# Patient Record
Sex: FEMALE | Race: BLACK OR AFRICAN AMERICAN | Employment: FULL TIME | URBAN - METROPOLITAN AREA
[De-identification: names, ages, dates, MRNs, and addresses within clinical notes are randomized per-mention and may not be internally consistent; named-entity substitution may affect disease eponyms.]

---

## 2017-01-06 ENCOUNTER — HOSPITAL ENCOUNTER (OUTPATIENT)
Age: 37
Discharge: HOME | End: 2017-01-06
Payer: COMMERCIAL

## 2017-01-06 VITALS
HEIGHT: 62 IN | DIASTOLIC BLOOD PRESSURE: 73 MMHG | SYSTOLIC BLOOD PRESSURE: 115 MMHG | WEIGHT: 176 LBS | RESPIRATION RATE: 16 BRPM | HEART RATE: 96 BPM | OXYGEN SATURATION: 97 % | TEMPERATURE: 98 F | BODY MASS INDEX: 32.39 KG/M2

## 2017-01-06 DIAGNOSIS — J03.90 EXUDATIVE TONSILLITIS: Primary | ICD-10-CM

## 2017-01-06 DIAGNOSIS — J06.9 UPPER RESPIRATORY TRACT INFECTION, UNSPECIFIED TYPE: ICD-10-CM

## 2017-01-06 LAB — POCT RAPID STREP: NEGATIVE

## 2017-01-06 PROCEDURE — 99214 OFFICE O/P EST MOD 30 MIN: CPT

## 2017-01-06 PROCEDURE — 87081 CULTURE SCREEN ONLY: CPT | Performed by: PHYSICIAN ASSISTANT

## 2017-01-06 PROCEDURE — 87430 STREP A AG IA: CPT | Performed by: PHYSICIAN ASSISTANT

## 2017-01-06 RX ORDER — CEFDINIR 300 MG/1
300 CAPSULE ORAL 2 TIMES DAILY
Qty: 20 CAPSULE | Refills: 0 | Status: SHIPPED | OUTPATIENT
Start: 2017-01-06 | End: 2017-01-16

## 2017-01-06 NOTE — ED INITIAL ASSESSMENT (HPI)
Sore throat, fever, cough for a few days, was waiting to see if I got better but have blood tinge sputum after coughing

## 2017-01-06 NOTE — ED PROVIDER NOTES
Patient Seen in: [facility]    History   Patient presents with:  Cough/URI    Stated Complaint: sorethroat / body aches / chills x 3 days    HPI    35yo F who comes in with complaints of sore throat, fever, chills, pain while sw 1118 97.8 °F (36.6 °C)   Temp src 01/06/17 1118 Temporal   SpO2 01/06/17 1118 97 %   O2 Device 01/06/17 1118 None (Room air)       Current:/73 mmHg  Pulse 96  Temp(Src) 97.8 °F (36.6 °C) (Temporal)  Resp 16  Ht 157.5 cm (5' 2\")  Wt 79.833 kg  BMI 32 (two) times daily. Qty: 20 capsule Refills: 0  Comments: Rxn to PCN is hives no anaphylaxis  Associated Diagnoses:Exudative tonsillitis;  Upper respiratory tract infection, unspecified type

## 2017-01-07 NOTE — ED NOTES
Call placed to pt's phone in response to [facility] text. Message left on voice mail asking pt to please return our call if any further questions or concerns.

## 2017-04-27 PROCEDURE — 87491 CHLMYD TRACH DNA AMP PROBE: CPT | Performed by: OBSTETRICS & GYNECOLOGY

## 2017-04-27 PROCEDURE — 87591 N.GONORRHOEAE DNA AMP PROB: CPT | Performed by: OBSTETRICS & GYNECOLOGY

## 2017-07-17 PROBLEM — O09.511 ADVANCED MATERNAL AGE, 1ST PREGNANCY, FIRST TRIMESTER: Status: ACTIVE | Noted: 2017-07-17

## 2017-07-17 PROCEDURE — 87491 CHLMYD TRACH DNA AMP PROBE: CPT | Performed by: OBSTETRICS & GYNECOLOGY

## 2017-07-17 PROCEDURE — 87591 N.GONORRHOEAE DNA AMP PROB: CPT | Performed by: OBSTETRICS & GYNECOLOGY

## 2017-08-04 PROCEDURE — 36415 COLL VENOUS BLD VENIPUNCTURE: CPT | Performed by: OBSTETRICS & GYNECOLOGY

## 2017-08-04 PROCEDURE — 85660 RBC SICKLE CELL TEST: CPT | Performed by: OBSTETRICS & GYNECOLOGY

## 2017-08-04 PROCEDURE — 86850 RBC ANTIBODY SCREEN: CPT | Performed by: OBSTETRICS & GYNECOLOGY

## 2017-08-04 PROCEDURE — 86762 RUBELLA ANTIBODY: CPT | Performed by: OBSTETRICS & GYNECOLOGY

## 2017-08-04 PROCEDURE — 87389 HIV-1 AG W/HIV-1&-2 AB AG IA: CPT | Performed by: OBSTETRICS & GYNECOLOGY

## 2017-08-04 PROCEDURE — 86900 BLOOD TYPING SEROLOGIC ABO: CPT | Performed by: OBSTETRICS & GYNECOLOGY

## 2017-08-04 PROCEDURE — 87086 URINE CULTURE/COLONY COUNT: CPT | Performed by: OBSTETRICS & GYNECOLOGY

## 2017-08-04 PROCEDURE — 86780 TREPONEMA PALLIDUM: CPT | Performed by: OBSTETRICS & GYNECOLOGY

## 2017-08-04 PROCEDURE — 86901 BLOOD TYPING SEROLOGIC RH(D): CPT | Performed by: OBSTETRICS & GYNECOLOGY

## 2017-08-04 PROCEDURE — 85025 COMPLETE CBC W/AUTO DIFF WBC: CPT | Performed by: OBSTETRICS & GYNECOLOGY

## 2017-08-04 PROCEDURE — 87340 HEPATITIS B SURFACE AG IA: CPT | Performed by: OBSTETRICS & GYNECOLOGY

## 2017-11-28 PROCEDURE — 82950 GLUCOSE TEST: CPT | Performed by: OBSTETRICS & GYNECOLOGY

## 2017-11-28 PROCEDURE — 86780 TREPONEMA PALLIDUM: CPT | Performed by: OBSTETRICS & GYNECOLOGY

## 2017-11-28 PROCEDURE — 87389 HIV-1 AG W/HIV-1&-2 AB AG IA: CPT | Performed by: OBSTETRICS & GYNECOLOGY

## 2017-12-07 PROCEDURE — 87086 URINE CULTURE/COLONY COUNT: CPT | Performed by: OBSTETRICS & GYNECOLOGY

## 2017-12-08 PROCEDURE — 82952 GTT-ADDED SAMPLES: CPT | Performed by: OBSTETRICS & GYNECOLOGY

## 2017-12-08 PROCEDURE — 82951 GLUCOSE TOLERANCE TEST (GTT): CPT | Performed by: OBSTETRICS & GYNECOLOGY

## 2017-12-08 PROCEDURE — 36415 COLL VENOUS BLD VENIPUNCTURE: CPT | Performed by: OBSTETRICS & GYNECOLOGY

## 2017-12-17 PROBLEM — O24.419 GDM (GESTATIONAL DIABETES MELLITUS): Status: RESOLVED | Noted: 2017-12-17 | Resolved: 2017-12-17

## 2017-12-17 PROBLEM — O24.419 GDM (GESTATIONAL DIABETES MELLITUS): Status: ACTIVE | Noted: 2017-12-17

## 2017-12-31 ENCOUNTER — HOSPITAL ENCOUNTER (OUTPATIENT)
Facility: HOSPITAL | Age: 37
Setting detail: OBSERVATION
Discharge: HOME | End: 2017-12-31
Attending: OBSTETRICS & GYNECOLOGY | Admitting: OBSTETRICS & GYNECOLOGY
Payer: COMMERCIAL

## 2017-12-31 ENCOUNTER — HOSPITAL ENCOUNTER (EMERGENCY)
Age: 37
Discharge: LEFT BEFORE DISCHARGE | End: 2017-12-31
Payer: COMMERCIAL

## 2017-12-31 ENCOUNTER — APPOINTMENT (OUTPATIENT)
Dept: ULTRASOUND IMAGING | Facility: HOSPITAL | Age: 37
End: 2017-12-31
Attending: OBSTETRICS & GYNECOLOGY
Payer: COMMERCIAL

## 2017-12-31 VITALS
RESPIRATION RATE: 16 BRPM | SYSTOLIC BLOOD PRESSURE: 104 MMHG | BODY MASS INDEX: 35.03 KG/M2 | OXYGEN SATURATION: 91 % | TEMPERATURE: 98 F | HEIGHT: 62.01 IN | HEART RATE: 93 BPM | DIASTOLIC BLOOD PRESSURE: 66 MMHG | WEIGHT: 192.81 LBS

## 2017-12-31 PROBLEM — Z34.90 PREGNANCY: Status: ACTIVE | Noted: 2017-12-31

## 2017-12-31 LAB
ALBUMIN SERPL-MCNC: 2.5 G/DL (ref 3.5–4.8)
ALP LIVER SERPL-CCNC: 108 U/L (ref 37–98)
ALT SERPL-CCNC: 20 U/L (ref 14–54)
AST SERPL-CCNC: 14 U/L (ref 15–41)
BASOPHILS # BLD AUTO: 0.02 X10(3) UL (ref 0–0.1)
BASOPHILS NFR BLD AUTO: 0.2 %
BILIRUB SERPL-MCNC: 0.3 MG/DL (ref 0.1–2)
BILIRUBIN URINE: NEGATIVE
BLOOD URINE: NEGATIVE
BUN BLD-MCNC: 5 MG/DL (ref 8–20)
CALCIUM BLD-MCNC: 8.8 MG/DL (ref 8.3–10.3)
CHLORIDE: 104 MMOL/L (ref 101–111)
CO2: 24 MMOL/L (ref 22–32)
CONTROL RUN WITHIN 24 HOURS?: YES
CREAT BLD-MCNC: 0.51 MG/DL (ref 0.55–1.02)
EOSINOPHIL # BLD AUTO: 0.12 X10(3) UL (ref 0–0.3)
EOSINOPHIL NFR BLD AUTO: 1.2 %
ERYTHROCYTE [DISTWIDTH] IN BLOOD BY AUTOMATED COUNT: 14.5 % (ref 11.5–16)
FETAL FIBRINECTIN: NEGATIVE
GLUCOSE BLD-MCNC: 97 MG/DL (ref 70–99)
GLUCOSE URINE: NEGATIVE
HCT VFR BLD AUTO: 33.8 % (ref 34–50)
HGB BLD-MCNC: 11 G/DL (ref 12–16)
IMMATURE GRANULOCYTE COUNT: 0.08 X10(3) UL (ref 0–1)
IMMATURE GRANULOCYTE RATIO %: 0.8 %
KETONE URINE: NEGATIVE
LEUKOCYTE ESTERASE URINE: NEGATIVE
LYMPHOCYTES # BLD AUTO: 1.46 X10(3) UL (ref 0.9–4)
LYMPHOCYTES NFR BLD AUTO: 14.9 %
M PROTEIN MFR SERPL ELPH: 6.7 G/DL (ref 6.1–8.3)
MCH RBC QN AUTO: 27.4 PG (ref 27–33.2)
MCHC RBC AUTO-ENTMCNC: 32.5 G/DL (ref 31–37)
MCV RBC AUTO: 84.1 FL (ref 81–100)
MONOCYTES # BLD AUTO: 0.63 X10(3) UL (ref 0.1–0.6)
MONOCYTES NFR BLD AUTO: 6.4 %
NEUTROPHIL ABS PRELIM: 7.47 X10 (3) UL (ref 1.3–6.7)
NEUTROPHILS # BLD AUTO: 7.47 X10(3) UL (ref 1.3–6.7)
NEUTROPHILS NFR BLD AUTO: 76.5 %
NITRITE URINE: NEGATIVE
PH URINE: 6 (ref 5–8)
PLATELET # BLD AUTO: 216 10(3)UL (ref 150–450)
POTASSIUM SERPL-SCNC: 3.8 MMOL/L (ref 3.6–5.1)
PROTEIN URINE: NEGATIVE
RBC # BLD AUTO: 4.02 X10(6)UL (ref 3.8–5.1)
RED CELL DISTRIBUTION WIDTH-SD: 44.1 FL (ref 35.1–46.3)
SODIUM SERPL-SCNC: 137 MMOL/L (ref 136–144)
SPEC GRAVITY: 1.02 (ref 1–1.03)
URINE CLARITY: CLEAR
URINE COLOR: YELLOW
UROBILINOGEN URINE: 0.2
WBC # BLD AUTO: 9.8 X10(3) UL (ref 4–13)

## 2017-12-31 PROCEDURE — 80053 COMPREHEN METABOLIC PANEL: CPT | Performed by: OBSTETRICS & GYNECOLOGY

## 2017-12-31 PROCEDURE — 76770 US EXAM ABDO BACK WALL COMP: CPT | Performed by: OBSTETRICS & GYNECOLOGY

## 2017-12-31 PROCEDURE — 96372 THER/PROPH/DIAG INJ SC/IM: CPT

## 2017-12-31 PROCEDURE — 96374 THER/PROPH/DIAG INJ IV PUSH: CPT

## 2017-12-31 PROCEDURE — 85025 COMPLETE CBC W/AUTO DIFF WBC: CPT | Performed by: OBSTETRICS & GYNECOLOGY

## 2017-12-31 PROCEDURE — 96361 HYDRATE IV INFUSION ADD-ON: CPT

## 2017-12-31 PROCEDURE — 81002 URINALYSIS NONAUTO W/O SCOPE: CPT

## 2017-12-31 PROCEDURE — 82731 ASSAY OF FETAL FIBRONECTIN: CPT | Performed by: OBSTETRICS & GYNECOLOGY

## 2017-12-31 RX ORDER — NIFEDIPINE 20 MG/1
20 CAPSULE ORAL EVERY 6 HOURS SCHEDULED
Qty: 56 CAPSULE | Refills: 0 | Status: SHIPPED | OUTPATIENT
Start: 2017-12-31 | End: 2018-01-14

## 2017-12-31 RX ORDER — SODIUM CHLORIDE, SODIUM LACTATE, POTASSIUM CHLORIDE, CALCIUM CHLORIDE 600; 310; 30; 20 MG/100ML; MG/100ML; MG/100ML; MG/100ML
INJECTION, SOLUTION INTRAVENOUS CONTINUOUS
Status: DISCONTINUED | OUTPATIENT
Start: 2017-12-31 | End: 2017-12-31

## 2017-12-31 RX ORDER — TERBUTALINE SULFATE 1 MG/ML
INJECTION, SOLUTION SUBCUTANEOUS
Status: DISCONTINUED
Start: 2017-12-31 | End: 2017-12-31

## 2017-12-31 RX ORDER — NIFEDIPINE 20 MG/1
20 CAPSULE ORAL EVERY 6 HOURS SCHEDULED
Status: DISCONTINUED | OUTPATIENT
Start: 2017-12-31 | End: 2017-12-31

## 2017-12-31 RX ORDER — HYDROMORPHONE HYDROCHLORIDE 1 MG/ML
0.2 INJECTION, SOLUTION INTRAMUSCULAR; INTRAVENOUS; SUBCUTANEOUS ONCE
Status: COMPLETED | OUTPATIENT
Start: 2017-12-31 | End: 2017-12-31

## 2017-12-31 RX ORDER — TERBUTALINE SULFATE 1 MG/ML
0.25 INJECTION, SOLUTION SUBCUTANEOUS ONCE
Status: COMPLETED | OUTPATIENT
Start: 2017-12-31 | End: 2017-12-31

## 2017-12-31 NOTE — PLAN OF CARE
ANTEPARTUM/LABOR and DELIVERY    • Maintain pregnancy as long as maternal and/or fetal condition is stable Progressing        ANXIETY    • Will report anxiety at manageable levels Progressing        BIRTH - VAGINAL/ SECTION    • Fetal and maternal

## 2017-12-31 NOTE — PROGRESS NOTES
Pt is d/c'd to home in stable condition via wheelchair. Both written and verbal instructions provided. Questions answered. PT verbalizes understanding and agreement.

## 2017-12-31 NOTE — PROGRESS NOTES
with an EDC of 18 (32 & 2/7 weeks gestation) presents to triage 3 reporting RLQ pain that comes and goes all day long; pain worsening since . Rates pain 7 or 8 out of 10. Pain correlates with contractions.   Patient denies bleeding or leakin

## 2017-12-31 NOTE — H&P
OB H&P    39 yo  at [place] [id number] presents with RLQ abdominal pain rating 7-8/20 last night. Upon presentation she also was noted to have contractions every 3-5 minutes where her pain worsened. She denies lof, no vb, +FM.   She has a known history of fibroid Smokeless tobacco: Never Used                        Alcohol use: Yes           0.0 oz/week       Comment: OCCAS    Drug use:  No              Sexual activity: Yes               Partners with: Male       Birth control/protection:         A compreh

## 2018-01-04 PROBLEM — O47.03 THREATENED PREMATURE LABOR IN THIRD TRIMESTER: Status: ACTIVE | Noted: 2018-01-04

## 2018-01-31 PROCEDURE — 87081 CULTURE SCREEN ONLY: CPT | Performed by: OBSTETRICS & GYNECOLOGY

## 2018-01-31 PROCEDURE — 87653 STREP B DNA AMP PROBE: CPT | Performed by: OBSTETRICS & GYNECOLOGY

## 2018-02-07 PROBLEM — O09.513 ADVANCED MATERNAL AGE, 1ST PREGNANCY, THIRD TRIMESTER: Status: ACTIVE | Noted: 2017-07-17

## 2018-02-13 ENCOUNTER — TELEPHONE (OUTPATIENT)
Dept: OBGYN UNIT | Facility: HOSPITAL | Age: 38
End: 2018-02-13

## 2018-02-13 PROBLEM — O47.03 THREATENED PREMATURE LABOR IN THIRD TRIMESTER: Status: RESOLVED | Noted: 2018-01-04 | Resolved: 2018-02-13

## 2018-02-16 NOTE — H&P
[address] and Delivery Prenatal History and Physical Interval Addendum  Please see full Prenatal Record for this pregnancy      SUBJECTIVE:    Interval History:      This is a pregnancy at 39 weeks admitted for  due to fetal malpresenta

## 2018-02-17 ENCOUNTER — HOSPITAL ENCOUNTER (INPATIENT)
Facility: HOSPITAL | Age: 38
LOS: 4 days | Discharge: HOME | End: 2018-02-21
Attending: OBSTETRICS & GYNECOLOGY | Admitting: OBSTETRICS & GYNECOLOGY
Payer: COMMERCIAL

## 2018-02-17 ENCOUNTER — SURGERY (OUTPATIENT)
Age: 38
End: 2018-02-17

## 2018-02-17 LAB
ANTIBODY SCREEN: NEGATIVE
BASOPHILS # BLD AUTO: 0.01 X10(3) UL (ref 0–0.1)
BASOPHILS NFR BLD AUTO: 0.2 %
BILIRUBIN URINE: NEGATIVE
BLOOD URINE: NEGATIVE
CONTROL RUN WITHIN 24 HOURS?: YES
EOSINOPHIL # BLD AUTO: 0.04 X10(3) UL (ref 0–0.3)
EOSINOPHIL NFR BLD AUTO: 0.7 %
ERYTHROCYTE [DISTWIDTH] IN BLOOD BY AUTOMATED COUNT: 16 % (ref 11.5–16)
GLUCOSE URINE: NEGATIVE
HCT VFR BLD AUTO: 31.5 % (ref 34–50)
HGB BLD-MCNC: 10.4 G/DL (ref 12–16)
IMMATURE GRANULOCYTE COUNT: 0.05 X10(3) UL (ref 0–1)
IMMATURE GRANULOCYTE RATIO %: 0.8 %
KETONE URINE: NEGATIVE
LEUKOCYTE ESTERASE URINE: NEGATIVE
LYMPHOCYTES # BLD AUTO: 1.19 X10(3) UL (ref 0.9–4)
LYMPHOCYTES NFR BLD AUTO: 19.7 %
MCH RBC QN AUTO: 26.7 PG (ref 27–33.2)
MCHC RBC AUTO-ENTMCNC: 33 G/DL (ref 31–37)
MCV RBC AUTO: 81 FL (ref 81–100)
MONOCYTES # BLD AUTO: 0.45 X10(3) UL (ref 0.1–1)
MONOCYTES NFR BLD AUTO: 7.5 %
NEUTROPHIL ABS PRELIM: 4.29 X10 (3) UL (ref 1.3–6.7)
NEUTROPHILS # BLD AUTO: 4.29 X10(3) UL (ref 1.3–6.7)
NEUTROPHILS NFR BLD AUTO: 71.1 %
NITRITE URINE: NEGATIVE
PH URINE: 6.5 (ref 5–8)
PLATELET # BLD AUTO: 227 10(3)UL (ref 150–450)
RAPID HIV: NONREACTIVE
RBC # BLD AUTO: 3.89 X10(6)UL (ref 3.8–5.1)
RED CELL DISTRIBUTION WIDTH-SD: 46.4 FL (ref 35.1–46.3)
RH BLOOD TYPE: POSITIVE
SPEC GRAVITY: 1.02 (ref 1–1.03)
T PALLIDUM AB SER QL IA: NONREACTIVE
URINE CLARITY: CLEAR
URINE COLOR: YELLOW
UROBILINOGEN URINE: 0.2
WBC # BLD AUTO: 6 X10(3) UL (ref 4–13)

## 2018-02-17 PROCEDURE — 85025 COMPLETE CBC W/AUTO DIFF WBC: CPT | Performed by: OBSTETRICS & GYNECOLOGY

## 2018-02-17 PROCEDURE — 86900 BLOOD TYPING SEROLOGIC ABO: CPT | Performed by: OBSTETRICS & GYNECOLOGY

## 2018-02-17 PROCEDURE — 86701 HIV-1ANTIBODY: CPT | Performed by: OBSTETRICS & GYNECOLOGY

## 2018-02-17 PROCEDURE — 86780 TREPONEMA PALLIDUM: CPT | Performed by: OBSTETRICS & GYNECOLOGY

## 2018-02-17 PROCEDURE — 86850 RBC ANTIBODY SCREEN: CPT | Performed by: OBSTETRICS & GYNECOLOGY

## 2018-02-17 PROCEDURE — 81002 URINALYSIS NONAUTO W/O SCOPE: CPT

## 2018-02-17 PROCEDURE — 86901 BLOOD TYPING SEROLOGIC RH(D): CPT | Performed by: OBSTETRICS & GYNECOLOGY

## 2018-02-17 PROCEDURE — 88307 TISSUE EXAM BY PATHOLOGIST: CPT | Performed by: OBSTETRICS & GYNECOLOGY

## 2018-02-17 RX ORDER — GENTAMICIN SULFATE 40 MG/ML
5 INJECTION, SOLUTION INTRAMUSCULAR; INTRAVENOUS ONCE
Status: COMPLETED | OUTPATIENT
Start: 2018-02-17 | End: 2018-02-17

## 2018-02-17 RX ORDER — DEXTROSE, SODIUM CHLORIDE, SODIUM LACTATE, POTASSIUM CHLORIDE, AND CALCIUM CHLORIDE 5; .6; .31; .03; .02 G/100ML; G/100ML; G/100ML; G/100ML; G/100ML
INJECTION, SOLUTION INTRAVENOUS CONTINUOUS
Status: DISCONTINUED | OUTPATIENT
Start: 2018-02-17 | End: 2018-02-21

## 2018-02-17 RX ORDER — METOCLOPRAMIDE HYDROCHLORIDE 5 MG/ML
10 INJECTION INTRAMUSCULAR; INTRAVENOUS EVERY 6 HOURS PRN
Status: DISCONTINUED | OUTPATIENT
Start: 2018-02-17 | End: 2018-02-21

## 2018-02-17 RX ORDER — KETOROLAC TROMETHAMINE 30 MG/ML
30 INJECTION, SOLUTION INTRAMUSCULAR; INTRAVENOUS ONCE AS NEEDED
Status: COMPLETED | OUTPATIENT
Start: 2018-02-17 | End: 2018-02-17

## 2018-02-17 RX ORDER — KETOROLAC TROMETHAMINE 30 MG/ML
30 INJECTION, SOLUTION INTRAMUSCULAR; INTRAVENOUS EVERY 6 HOURS PRN
Status: DISPENSED | OUTPATIENT
Start: 2018-02-17 | End: 2018-02-19

## 2018-02-17 RX ORDER — DIPHENHYDRAMINE HYDROCHLORIDE 50 MG/ML
25 INJECTION INTRAMUSCULAR; INTRAVENOUS ONCE AS NEEDED
Status: ACTIVE | OUTPATIENT
Start: 2018-02-17 | End: 2018-02-17

## 2018-02-17 RX ORDER — SODIUM CHLORIDE, SODIUM LACTATE, POTASSIUM CHLORIDE, CALCIUM CHLORIDE 600; 310; 30; 20 MG/100ML; MG/100ML; MG/100ML; MG/100ML
INJECTION, SOLUTION INTRAVENOUS CONTINUOUS
Status: DISCONTINUED | OUTPATIENT
Start: 2018-02-17 | End: 2018-02-21

## 2018-02-17 RX ORDER — CLINDAMYCIN PHOSPHATE 900 MG/50ML
INJECTION INTRAVENOUS
Status: DISCONTINUED | OUTPATIENT
Start: 2018-02-17 | End: 2018-02-21

## 2018-02-17 RX ORDER — ZOLPIDEM TARTRATE 5 MG/1
5 TABLET ORAL NIGHTLY PRN
Status: DISCONTINUED | OUTPATIENT
Start: 2018-02-17 | End: 2018-02-21

## 2018-02-17 RX ORDER — NALOXONE HYDROCHLORIDE 0.4 MG/ML
0.08 INJECTION, SOLUTION INTRAMUSCULAR; INTRAVENOUS; SUBCUTANEOUS
Status: ACTIVE | OUTPATIENT
Start: 2018-02-17 | End: 2018-02-18

## 2018-02-17 RX ORDER — NALBUPHINE HCL 10 MG/ML
2.5 AMPUL (ML) INJECTION EVERY 4 HOURS PRN
Status: DISCONTINUED | OUTPATIENT
Start: 2018-02-17 | End: 2018-02-21

## 2018-02-17 RX ORDER — SODIUM CHLORIDE 9 MG/ML
100 INJECTION, SOLUTION INTRAVENOUS ONCE
Status: DISCONTINUED | OUTPATIENT
Start: 2018-02-17 | End: 2018-02-17

## 2018-02-17 RX ORDER — IBUPROFEN 600 MG/1
600 TABLET ORAL EVERY 6 HOURS SCHEDULED
Status: DISCONTINUED | OUTPATIENT
Start: 2018-02-18 | End: 2018-02-21

## 2018-02-17 RX ORDER — DIPHENHYDRAMINE HCL 25 MG
25 CAPSULE ORAL EVERY 4 HOURS PRN
Status: DISCONTINUED | OUTPATIENT
Start: 2018-02-17 | End: 2018-02-21

## 2018-02-17 RX ORDER — HYDROCODONE BITARTRATE AND ACETAMINOPHEN 10; 325 MG/1; MG/1
1 TABLET ORAL EVERY 4 HOURS PRN
Status: DISCONTINUED | OUTPATIENT
Start: 2018-02-17 | End: 2018-02-21

## 2018-02-17 RX ORDER — SIMETHICONE 80 MG
80 TABLET,CHEWABLE ORAL 3 TIMES DAILY PRN
Status: DISCONTINUED | OUTPATIENT
Start: 2018-02-17 | End: 2018-02-21

## 2018-02-17 RX ORDER — HYDROCODONE BITARTRATE AND ACETAMINOPHEN 5; 325 MG/1; MG/1
1 TABLET ORAL EVERY 4 HOURS PRN
Status: DISCONTINUED | OUTPATIENT
Start: 2018-02-17 | End: 2018-02-21

## 2018-02-17 RX ORDER — GENTAMICIN SULFATE 40 MG/ML
INJECTION, SOLUTION INTRAMUSCULAR; INTRAVENOUS
Status: COMPLETED
Start: 2018-02-17 | End: 2018-02-17

## 2018-02-17 RX ORDER — DIPHENHYDRAMINE HYDROCHLORIDE 50 MG/ML
12.5 INJECTION INTRAMUSCULAR; INTRAVENOUS EVERY 4 HOURS PRN
Status: DISCONTINUED | OUTPATIENT
Start: 2018-02-17 | End: 2018-02-21

## 2018-02-17 RX ORDER — SODIUM CHLORIDE, SODIUM LACTATE, POTASSIUM CHLORIDE, CALCIUM CHLORIDE 600; 310; 30; 20 MG/100ML; MG/100ML; MG/100ML; MG/100ML
INJECTION, SOLUTION INTRAVENOUS CONTINUOUS
Status: DISCONTINUED | OUTPATIENT
Start: 2018-02-17 | End: 2018-02-17

## 2018-02-17 RX ORDER — DOCUSATE SODIUM 100 MG/1
100 CAPSULE, LIQUID FILLED ORAL
Status: DISCONTINUED | OUTPATIENT
Start: 2018-02-18 | End: 2018-02-21

## 2018-02-17 RX ORDER — NALBUPHINE HCL 10 MG/ML
2.5 AMPUL (ML) INJECTION
Status: DISCONTINUED | OUTPATIENT
Start: 2018-02-17 | End: 2018-02-17 | Stop reason: HOSPADM

## 2018-02-17 RX ORDER — TRISODIUM CITRATE DIHYDRATE AND CITRIC ACID MONOHYDRATE 500; 334 MG/5ML; MG/5ML
30 SOLUTION ORAL ONCE
Status: COMPLETED | OUTPATIENT
Start: 2018-02-17 | End: 2018-02-17

## 2018-02-17 RX ORDER — CLINDAMYCIN PHOSPHATE 900 MG/50ML
900 INJECTION INTRAVENOUS ONCE
Status: DISCONTINUED | OUTPATIENT
Start: 2018-02-17 | End: 2018-02-17

## 2018-02-17 RX ORDER — ONDANSETRON 2 MG/ML
4 INJECTION INTRAMUSCULAR; INTRAVENOUS ONCE AS NEEDED
Status: ACTIVE | OUTPATIENT
Start: 2018-02-17 | End: 2018-02-17

## 2018-02-17 RX ORDER — ONDANSETRON 2 MG/ML
4 INJECTION INTRAMUSCULAR; INTRAVENOUS EVERY 6 HOURS PRN
Status: DISCONTINUED | OUTPATIENT
Start: 2018-02-17 | End: 2018-02-21

## 2018-02-17 RX ORDER — DEXTROSE MONOHYDRATE 25 G/50ML
50 INJECTION, SOLUTION INTRAVENOUS
Status: DISCONTINUED | OUTPATIENT
Start: 2018-02-17 | End: 2018-02-21

## 2018-02-17 RX ORDER — BISACODYL 10 MG
10 SUPPOSITORY, RECTAL RECTAL
Status: DISCONTINUED | OUTPATIENT
Start: 2018-02-17 | End: 2018-02-21

## 2018-02-17 NOTE — INTERVAL H&P NOTE
Pre-op Diagnosis: 39 1/7 wks scheduled RCS, transverse lie    Bedside ultrasound peformed, and fetus still in same transverse lie.

## 2018-02-17 NOTE — OPERATIVE REPORT
PREOPERATIVE DIAGNOSIS:   1. 39 week pregnancy  2. Fetal malpresentation  3. Uterine fibroids    POSTOPERATIVE DIAGNOSIS:   same    PROCEDURE PERFORMED: primary low vertical  section.      SURGEON: Ajit Jovel MD    ASSISTANT: Jonna White MD    AN cleared. We rechecked the uterine and   bladder flap hemostasis and with minimal cautery found it to be good. Subfascial area was inspected and found to be dry. 0 Vicryl was used to close the fascia in a   continuous running fashion.  Subcutaneous tissue

## 2018-02-17 NOTE — PLAN OF CARE
Problem: Patient/Family Goals  Goal: Patient/Family Long Term Goal  Patient's Long Term Goal: uncomplicated  delivery  Interventions:    - See additional Care Plan goals for specific interventions   Outcome: Progressing    Goal: Patient/Family Linda Alarcon

## 2018-02-17 NOTE — PROGRESS NOTES
WITH EDC 2018 ( 39 1/7 WEEKS) PRESENTS TO L&D FOR SCHEDULED  SECTION. PT. DENIES PROBLEMS WITH PREGNANCY, GROSS VAGINAL BLEEDING OR SROM. EFM AND TOCO PLACED. FHT'S REACTIVE. NO CTX'S NOTED. WILL UPDATE DR. ISABEL OF THE ABOVE ASSESSMEN

## 2018-02-17 NOTE — PLAN OF CARE
ANXIETY    • Will report anxiety at manageable levels Completed        BIRTH - VAGINAL/ SECTION    • Fetal and maternal status remain reassuring during the birth process Completed        CARDIOVASCULAR - ADULT    • Maintains optimal cardiac output

## 2018-02-17 NOTE — PLAN OF CARE
ANXIETY    • Will report anxiety at manageable levels Progressing        BIRTH - VAGINAL/ SECTION    • Fetal and maternal status remain reassuring during the birth process Progressing        CARDIOVASCULAR - ADULT    • Maintains optimal cardiac out

## 2018-02-18 LAB
BASOPHILS # BLD AUTO: 0.02 X10(3) UL (ref 0–0.1)
BASOPHILS NFR BLD AUTO: 0.3 %
EOSINOPHIL # BLD AUTO: 0.02 X10(3) UL (ref 0–0.3)
EOSINOPHIL NFR BLD AUTO: 0.3 %
ERYTHROCYTE [DISTWIDTH] IN BLOOD BY AUTOMATED COUNT: 16.1 % (ref 11.5–16)
HCT VFR BLD AUTO: 26.5 % (ref 34–50)
HGB BLD-MCNC: 8.3 G/DL (ref 12–16)
IMMATURE GRANULOCYTE COUNT: 0.03 X10(3) UL (ref 0–1)
IMMATURE GRANULOCYTE RATIO %: 0.4 %
LYMPHOCYTES # BLD AUTO: 0.84 X10(3) UL (ref 0.9–4)
LYMPHOCYTES NFR BLD AUTO: 11.4 %
MCH RBC QN AUTO: 25.2 PG (ref 27–33.2)
MCHC RBC AUTO-ENTMCNC: 31.3 G/DL (ref 31–37)
MCV RBC AUTO: 80.5 FL (ref 81–100)
MONOCYTES # BLD AUTO: 0.43 X10(3) UL (ref 0.1–1)
MONOCYTES NFR BLD AUTO: 5.8 %
NEUTROPHIL ABS PRELIM: 6.06 X10 (3) UL (ref 1.3–6.7)
NEUTROPHILS # BLD AUTO: 6.06 X10(3) UL (ref 1.3–6.7)
NEUTROPHILS NFR BLD AUTO: 81.8 %
PLATELET # BLD AUTO: 175 10(3)UL (ref 150–450)
RBC # BLD AUTO: 3.29 X10(6)UL (ref 3.8–5.1)
RED CELL DISTRIBUTION WIDTH-SD: 46.9 FL (ref 35.1–46.3)
WBC # BLD AUTO: 7.4 X10(3) UL (ref 4–13)

## 2018-02-18 PROCEDURE — 85025 COMPLETE CBC W/AUTO DIFF WBC: CPT | Performed by: OBSTETRICS & GYNECOLOGY

## 2018-02-18 NOTE — PROGRESS NOTES
Patient complaints: none. Hungry. Vital signs reviewed    Examination:  General: alert, appropriate affect  Abdomen: Fundus firm and no unexpected tenderness.   Remainder of abdomen unremarkable  Incision: dry, intact, no unexpected findings  Lochia: ap

## 2018-02-18 NOTE — PROGRESS NOTES
Notified Dr. Julieta Gannon of patient having episode of bleeding, pressure applied and clot present. No further active bleeding noted. Pressure dressing on.

## 2018-02-18 NOTE — PROGRESS NOTES
[facility]    Patients Name: Carolyn Mortensen  Attending Physician: Lieutenant Aaron MD  CSN: [id number]    Location:  2209/2209-A  MRN: [id number]    YOB: 1980  Admission Date: 2/17/2018     Obstetric Anesthesia Pain Progress Note    Post-O

## 2018-02-19 NOTE — CM/SW NOTE
CM met with pt in her room with review insurance for infant and PCP. Pt stated that they were going to add infant to their insurance plan. Pt stated that they plan to take infant to see Dr Evelina Condon. CM reviewed Auth process and discharge planning process wit

## 2018-02-19 NOTE — PROGRESS NOTES
[facility]  Post-Partum Caesarean Section Progress Note    Saira Mock Patient Status:  Inpatient    1980 MRN [id number]   [facility] 2SW-J Attending Stephane Arriaga MD   Hosp Day # 2 PCP Sandhya Melendrez MD     SUBJECTIVE:    Po

## 2018-02-20 NOTE — CM/SW NOTE
02/20/18 1200   Referral Data   Referral Source Self referral   Referral Reason Counseling/support;Psychosocial assessment     SW met with pt to offer support and complete assessment due to the NICU admission of her baby girl.      Pt presented with a ch

## 2018-02-20 NOTE — LACTATION NOTE
Assist with attempt to latch baby to breast in NICU for 530 pm feeding. Severe engorgement is present and it is difficult for baby to compress the nipple and areolar area to stimulate milk flow.   Baby did latch on and suckle several times with an adequate

## 2018-02-20 NOTE — PROGRESS NOTES
Postop Day 3    Pt without complaints.      Temp: 98.3 °F (36.8 °C)  Pulse: 98  Resp: 18  BP: 132/81  abd  soft, NT, ND, fundus firm below umbilicus, incision C/D/I  extr  trace edema, no calf tenderness    Impression: POD#3  Plan:  Discharge tomorrow

## 2018-02-21 VITALS
HEIGHT: 62 IN | WEIGHT: 198 LBS | DIASTOLIC BLOOD PRESSURE: 78 MMHG | TEMPERATURE: 98 F | BODY MASS INDEX: 36.44 KG/M2 | HEART RATE: 72 BPM | RESPIRATION RATE: 18 BRPM | OXYGEN SATURATION: 100 % | SYSTOLIC BLOOD PRESSURE: 124 MMHG

## 2018-02-21 RX ORDER — HYDROCODONE BITARTRATE AND ACETAMINOPHEN 10; 325 MG/1; MG/1
1 TABLET ORAL EVERY 6 HOURS PRN
Qty: 25 TABLET | Refills: 0 | Status: SHIPPED | OUTPATIENT
Start: 2018-02-21 | End: 2018-03-02

## 2018-02-21 NOTE — DISCHARGE SUMMARY
[facility]  Discharge Summary    Kayla Nelson Patient Status:  Inpatient    1980 MRN [id number]   [facility] 2SW-J Attending Rain Marie MD   Hosp Day # 4 PCP Ignacio Brock MD     Date of Admission: 2018    Date of Dis

## 2018-02-21 NOTE — PROGRESS NOTES
Discharge patient from M/B Unit . Questions and concerns regarding discharge and home care completed, patient states understanding. Mother and father went to NICU where baby will be d/c if all tests completed.

## 2018-02-21 NOTE — PROGRESS NOTES
Postop Day 4    Pt without complaints.      Temp: 97.8 °F (36.6 °C)  Pulse: 72  Resp: 18  BP: 124/78  abd  soft, NT, ND, fundus firm below umbilicus, incision C/D/I  extr  trace edema, no calf tenderness    Impression: POD#4  Plan:  Discharge instructions r

## 2018-02-24 ENCOUNTER — NURSE ONLY (OUTPATIENT)
Dept: LACTATION | Facility: HOSPITAL | Age: 38
End: 2018-02-24
Attending: OBSTETRICS & GYNECOLOGY
Payer: COMMERCIAL

## 2018-02-24 DIAGNOSIS — O92.79 POOR LATCH ON, POSTPARTUM: ICD-10-CM

## 2018-02-24 DIAGNOSIS — O92.79 ENGORGEMENT OF BREASTS, POSTPARTUM CONDITION OR COMPLICATION: Primary | ICD-10-CM

## 2018-02-24 PROCEDURE — 99212 OFFICE O/P EST SF 10 MIN: CPT

## 2018-02-25 ENCOUNTER — TELEPHONE (OUTPATIENT)
Dept: OBGYN UNIT | Facility: HOSPITAL | Age: 38
End: 2018-02-25

## 2018-10-25 PROBLEM — L63.9 ALOPECIA AREATA: Status: ACTIVE | Noted: 2018-10-25

## 2018-10-25 PROBLEM — E66.9 OBESITY (BMI 30-39.9): Status: ACTIVE | Noted: 2018-10-25

## 2018-10-25 PROBLEM — Z34.90 PREGNANCY: Status: RESOLVED | Noted: 2017-12-31 | Resolved: 2018-10-25

## 2018-10-28 ENCOUNTER — HOSPITAL ENCOUNTER (INPATIENT)
Facility: HOSPITAL | Age: 38
LOS: 1 days | Discharge: HOME | DRG: 916 | End: 2018-10-29
Attending: EMERGENCY MEDICINE | Admitting: HOSPITALIST
Payer: COMMERCIAL

## 2018-10-28 DIAGNOSIS — T78.2XXA ANAPHYLAXIS, INITIAL ENCOUNTER: ICD-10-CM

## 2018-10-28 DIAGNOSIS — K13.79 SOFT PALATE EDEMA: Primary | ICD-10-CM

## 2018-10-28 DIAGNOSIS — K12.2 UVULITIS: ICD-10-CM

## 2018-10-28 PROCEDURE — 85025 COMPLETE CBC W/AUTO DIFF WBC: CPT | Performed by: EMERGENCY MEDICINE

## 2018-10-28 PROCEDURE — 99291 CRITICAL CARE FIRST HOUR: CPT | Performed by: EMERGENCY MEDICINE

## 2018-10-28 PROCEDURE — 96375 TX/PRO/DX INJ NEW DRUG ADDON: CPT | Performed by: EMERGENCY MEDICINE

## 2018-10-28 PROCEDURE — 96374 THER/PROPH/DIAG INJ IV PUSH: CPT | Performed by: EMERGENCY MEDICINE

## 2018-10-28 PROCEDURE — S0028 INJECTION, FAMOTIDINE, 20 MG: HCPCS | Performed by: EMERGENCY MEDICINE

## 2018-10-28 PROCEDURE — 96372 THER/PROPH/DIAG INJ SC/IM: CPT | Performed by: EMERGENCY MEDICINE

## 2018-10-28 PROCEDURE — 96361 HYDRATE IV INFUSION ADD-ON: CPT | Performed by: EMERGENCY MEDICINE

## 2018-10-28 PROCEDURE — 80053 COMPREHEN METABOLIC PANEL: CPT | Performed by: EMERGENCY MEDICINE

## 2018-10-28 PROCEDURE — 96365 THER/PROPH/DIAG IV INF INIT: CPT | Performed by: EMERGENCY MEDICINE

## 2018-10-28 RX ORDER — DEXAMETHASONE SODIUM PHOSPHATE 4 MG/ML
10 VIAL (ML) INJECTION ONCE
Status: COMPLETED | OUTPATIENT
Start: 2018-10-28 | End: 2018-10-28

## 2018-10-28 RX ORDER — FAMOTIDINE 10 MG/ML
20 INJECTION, SOLUTION INTRAVENOUS ONCE
Status: COMPLETED | OUTPATIENT
Start: 2018-10-28 | End: 2018-10-28

## 2018-10-28 RX ORDER — CLINDAMYCIN PHOSPHATE 900 MG/50ML
900 INJECTION INTRAVENOUS ONCE
Status: COMPLETED | OUTPATIENT
Start: 2018-10-28 | End: 2018-10-28

## 2018-10-28 RX ORDER — METHYLPREDNISOLONE SODIUM SUCCINATE 125 MG/2ML
125 INJECTION, POWDER, LYOPHILIZED, FOR SOLUTION INTRAMUSCULAR; INTRAVENOUS ONCE
Status: COMPLETED | OUTPATIENT
Start: 2018-10-28 | End: 2018-10-28

## 2018-10-28 RX ORDER — DIPHENHYDRAMINE HYDROCHLORIDE 50 MG/ML
50 INJECTION INTRAMUSCULAR; INTRAVENOUS ONCE
Status: COMPLETED | OUTPATIENT
Start: 2018-10-28 | End: 2018-10-28

## 2018-10-29 VITALS
RESPIRATION RATE: 30 BRPM | HEIGHT: 62.01 IN | TEMPERATURE: 98 F | WEIGHT: 182.13 LBS | HEART RATE: 97 BPM | OXYGEN SATURATION: 97 % | SYSTOLIC BLOOD PRESSURE: 131 MMHG | BODY MASS INDEX: 33.09 KG/M2 | DIASTOLIC BLOOD PRESSURE: 70 MMHG

## 2018-10-29 PROCEDURE — 85025 COMPLETE CBC W/AUTO DIFF WBC: CPT | Performed by: HOSPITALIST

## 2018-10-29 PROCEDURE — 80048 BASIC METABOLIC PNL TOTAL CA: CPT | Performed by: HOSPITALIST

## 2018-10-29 PROCEDURE — S0028 INJECTION, FAMOTIDINE, 20 MG: HCPCS | Performed by: NURSE PRACTITIONER

## 2018-10-29 PROCEDURE — 87081 CULTURE SCREEN ONLY: CPT | Performed by: HOSPITALIST

## 2018-10-29 PROCEDURE — S0077 INJECTION, CLINDAMYCIN PHOSP: HCPCS | Performed by: NURSE PRACTITIONER

## 2018-10-29 PROCEDURE — 0CJS8ZZ INSPECTION OF LARYNX, VIA NATURAL OR ARTIFICIAL OPENING ENDOSCOPIC: ICD-10-PCS | Performed by: OTOLARYNGOLOGY

## 2018-10-29 RX ORDER — CLINDAMYCIN PHOSPHATE 600 MG/50ML
600 INJECTION INTRAVENOUS EVERY 8 HOURS
Status: DISCONTINUED | OUTPATIENT
Start: 2018-10-29 | End: 2018-10-30

## 2018-10-29 RX ORDER — SODIUM CHLORIDE 9 MG/ML
INJECTION, SOLUTION INTRAVENOUS CONTINUOUS
Status: DISCONTINUED | OUTPATIENT
Start: 2018-10-29 | End: 2018-10-30

## 2018-10-29 RX ORDER — FAMOTIDINE 10 MG/ML
20 INJECTION, SOLUTION INTRAVENOUS 2 TIMES DAILY
Status: DISCONTINUED | OUTPATIENT
Start: 2018-10-29 | End: 2018-10-30

## 2018-10-29 RX ORDER — METOCLOPRAMIDE HYDROCHLORIDE 5 MG/ML
10 INJECTION INTRAMUSCULAR; INTRAVENOUS EVERY 8 HOURS PRN
Status: DISCONTINUED | OUTPATIENT
Start: 2018-10-29 | End: 2018-10-30

## 2018-10-29 RX ORDER — DEXAMETHASONE SODIUM PHOSPHATE 4 MG/ML
4 VIAL (ML) INJECTION EVERY 6 HOURS
Status: DISCONTINUED | OUTPATIENT
Start: 2018-10-29 | End: 2018-10-30

## 2018-10-29 RX ORDER — DIPHENHYDRAMINE HYDROCHLORIDE 50 MG/ML
12.5 INJECTION INTRAMUSCULAR; INTRAVENOUS EVERY 6 HOURS
Status: DISCONTINUED | OUTPATIENT
Start: 2018-10-29 | End: 2018-10-30

## 2018-10-29 RX ORDER — CLINDAMYCIN HYDROCHLORIDE 300 MG/1
600 CAPSULE ORAL 3 TIMES DAILY
Qty: 42 CAPSULE | Refills: 0 | Status: SHIPPED | OUTPATIENT
Start: 2018-10-29 | End: 2018-11-02

## 2018-10-29 RX ORDER — HEPARIN SODIUM 5000 [USP'U]/ML
5000 INJECTION, SOLUTION INTRAVENOUS; SUBCUTANEOUS EVERY 12 HOURS SCHEDULED
Status: DISCONTINUED | OUTPATIENT
Start: 2018-10-29 | End: 2018-10-30

## 2018-10-29 RX ORDER — METHYLPREDNISOLONE 4 MG/1
TABLET ORAL
Qty: 21 TABLET | Refills: 0 | Status: SHIPPED | OUTPATIENT
Start: 2018-10-29 | End: 2019-02-11 | Stop reason: ALTCHOICE

## 2018-10-29 RX ORDER — ACETAMINOPHEN 325 MG/1
650 TABLET ORAL EVERY 6 HOURS PRN
Status: DISCONTINUED | OUTPATIENT
Start: 2018-10-29 | End: 2018-10-30

## 2018-10-29 RX ORDER — ONDANSETRON 2 MG/ML
4 INJECTION INTRAMUSCULAR; INTRAVENOUS EVERY 6 HOURS PRN
Status: DISCONTINUED | OUTPATIENT
Start: 2018-10-29 | End: 2018-10-30

## 2018-10-29 NOTE — PLAN OF CARE
Impaired Swallowing    • Minimize aspiration risk Progressing        MUSCULOSKELETAL - ADULT    • Return mobility to safest level of function Progressing        PAIN - ADULT    • Verbalizes/displays adequate comfort level or patient's stated pain goal Prog

## 2018-10-29 NOTE — ED NOTES
Pt arrived to [facility] ED from [facility]. Pt with allergic rxn. Pt was medicated at the PED. Paramedics report that pt's voice is louder, stronger and less muffled since they first encountered the patient prior to transport.   Pt reports improv

## 2018-10-29 NOTE — PLAN OF CARE
Patient/Family Goals    • Patient/Family Long Term Goal Not Progressing    • Patient/Family Short Term Goal Not Progressing          Impaired Swallowing    • Minimize aspiration risk Not Progressing          PAIN - ADULT    • Verbalizes/displays adequate c

## 2018-10-29 NOTE — H&P
General Medicine H&P     Patient presents with: Allergic Rxn Allergies (immune)       PCP: Leo April, DO    History of Present Illness: Patient is a 41year old female with PMH sig for cardiac murmor who p/t PF ED c throat swelling.     Pt as having erin Comment: OCCAS       Fam Hx  Family History   Problem Relation Age of Onset   • Diabetes Father    • High Blood Pressure Father    • Diabetes Mother    • High Blood Pressure Mother    • Other (IBS [Other]) Mother        Review of Systems  Comprehensive ROS by ENT, ok for dc. DC'ed on medrol dose dominique and clinda. F/u c PCP.  Angioedema nearly resolved    Renate Llamas MD

## 2018-10-29 NOTE — ED INITIAL ASSESSMENT (HPI)
Ate a \"cold eeze\" tablet And almost instantly felt swelling to back of throat, difficulty swallowing.  Noted large swollen uvula

## 2018-10-29 NOTE — ED PROVIDER NOTES
Patient Seen in: [facility] Emergency Department In [place]    History   Patient presents with:   Allergic Rxn Allergies (immune)    Stated Complaint: Allergic rx    HPI    13-year-old female presents emergency department who states was having a little sor Current:/83   Pulse 102   Resp 20   Ht 157.5 cm (5' 2\")   Wt 81.6 kg   LMP 10/14/2018 (Approximate)   SpO2 100%   BMI 32.92 kg/m²         Physical Exam  Vital signs reviewed  General appearance: Patient is alert and in moderate distress, anxio difficulty clearing secretions after medications. This point I do not feel comfortable having her stay here in a stand-alone ER. We will transfer her over to [facility] where she can wait for a bed.   Did discuss case with ENT critical care and the

## 2018-10-29 NOTE — PROGRESS NOTES
ICU  Critical Care APRN H & P    NAME: Pascual Nichole - ROOM: [facility]326-X - MRN: [id number] - Age: 41year old - :1980    History Of Present Illness:  Pascual Nichole is a 41year old female presenting with sudden onset soft palate edema, pain with swallow Diabetes Father    • High Blood Pressure Father    • Diabetes Mother    • High Blood Pressure Mother    • Other (IBS [Other]) Mother          Review of Systems:   A comprehensive 10 point review of systems was completed.   Pertinent positives and negatives benadryl, pepcid, solu-medrol, decadron, and clindamycin in ED  - Continue benadryl, pepcid, decadron, and clindamycin  - Monitor for worsening symptoms  - Trend WBCs  - NPO   - ENT following    F/E/N:    - NPO  - NS @ 83mL/hr  - Electrolyte replacement pe

## 2018-10-29 NOTE — CONSULTS
Pulmonary H&P/Consult       NAME: Saira Mock - ROOM: 342/0-X - MRN: [id number] - Age: 41year old - :  1980    Date of Admission: 10/28/2018 10:12 PM  Admission Diagnosis: Uvulitis [K12.2]  Soft palate edema [K13.79]  Anaphylaxis, initial encoun Cold-Eeze               ANAPHYLAXIS  Pcn [Penicillins]       HIVES  Sulfa Antibiotics       RASH    Social History:  Social History    Socioeconomic History      Marital status: Single      Spouse name: Not on file      Number of children: Not on henrietta denies blurred vision or double vision   HEENT:  See above  RESPIRATORY:  denies SOB, cough, dyspnea   CARDIOVASCULAR:  denies current chest pain   GI:  denies abdominal pain  :  denies dysuria or changes in stream   MUSCULOSKELETAL:  denies back pain Regular rate and rhythm, S1 and S2 normal, no murmur, rub   or gallop   Abdomen:     Soft, non-tender, bowel sounds active all four quadrants,     no masses, no organomegaly   Extremities:   Extremities normal, atraumatic, no cyanosis or edema   Pulses:

## 2018-10-29 NOTE — PAYOR COMM NOTE
--------------  ADMISSION REVIEW     Payor: [employer]  Subscriber #:  [id number]  Authorization Number: [id number]    Admit date: 10/28/18  Admit time: 2352       Admitting Physician: Nereida Calvin MD  Attending Physician:  Mounika Douglas MD  [facility] Performed by Niharika Dumont MD at [facility] MAIN OR           Social History    Tobacco Use      Smoking status: Never Smoker      Smokeless tobacco: Never Used    Alcohol use:  Yes      Alcohol/week: 0.0 oz      Comment: OCCAS    Drug use: No      Review of Sy CBC W/ DIFFERENTIAL[[id number]]                              Final result                 Please view results for these tests on the individual orders.    COMP METABOLIC PANEL (14)   CBC W/ DIFFERENTIAL          Patient was given epinephrine, Solu-Medrol will be transferred to floor for further definitive care. Patient's questions were answered.   Admission disposition: 10/28/2018 10:41 PM                 Disposition and Plan     Clinical Impression:  Soft palate edema  (primary encounter diagnosis)  Uvuli EPINEPHrine HCl (ADRENALIN) 1 MG/ML injection (Allergic Reaction Kit) 0.3 mg     Date Action Dose Route User    10/28/2018 2219 Given 0.3 mg Intramuscular (Left Vastus Lateralis) Gin Rodrigues RN      famoTIDine (PEPCID) injection 20 mg     Date Act pain, coughing and sensation of choking. She also noted nasal congestion and dyspnea. Pt denies any new foods or drinks yesterday. Denies fevers or chills. She was given steroids, benadryl, pepsid and started on abx. ENT was consulted.   Pt was transfe insecurity - inability: Not on file      Transportation needs - medical: Not on file      Transportation needs - non-medical: Not on file    Occupational History      Not on file    Tobacco Use      Smoking status: Never Smoker      Smokeless tobacco: Tyler Catalan denies thyroid history        OBJECTIVE:  Vitals     10/29/18  0400 10/29/18  0500 10/29/18  0600 10/29/18  0700   BP: 100/58 117/65 100/56 114/72   BP Location: Left arm         Pulse: 65 75 63 67   Resp: 18 26 16 22   Temp: 97.5 °F (36.4 °C)         Temp Neurologic:   CNII-XII intact.  Normal strength, sensation and reflexes       throughout                 Recent Labs      10/28/18   2227  10/29/18   0437   WBC  5.8  6.9   HGB  12.9  13.2   MCV  84.6  85.7   PLT  303.0  272.0              Recent Labs 114/72 97 % — — — CW   10/29/18 0600 — 63 16 100/56 98 % — — — SS   10/29/18 0500 — 75 26 117/65 98 % — — — SS   10/29/18 0400 97.5 °F (36.4 °C) 65 18 100/58 97 % — None (Room air) — SS   10/29/18 0300 — 69 21 115/63 97 % — — — SS   10/29/18 0200 — 75 20 1

## 2018-10-30 NOTE — PROGRESS NOTES
Received pt alert and oriented x4, Pt asked when she can be discharged, She stated that ENT MD said she could be discharged. Called CC ANGEL Oakes regarding possible discharge. He reviewed case order placed for ok to dc refer meds with pcp.  Called Dr. Keith Macdonald

## 2018-10-30 NOTE — PAYOR COMM NOTE
--------------  DISCHARGE REVIEW    Payor: [employer]LR[employer] CROW  Subscriber #:  [id number]  Authorization Number: [id number]    Admit date: 10/28/18  Admit time:  2352  Discharge Date: 10/29/2018  9:45 PM     Admitting Physician: Claire Andersen MD  Attending Physici

## 2018-10-30 NOTE — PROGRESS NOTES
Clindamycin and decadron completed. PIV right ac removed, intact pressure applied, no bleeding no hematoma. All belongings with pt glasses, , and phone,  at bedside. Transport and  with patient transport via wheelchair.

## 2019-07-22 PROBLEM — O09.513 ADVANCED MATERNAL AGE, 1ST PREGNANCY, THIRD TRIMESTER: Status: RESOLVED | Noted: 2017-07-17 | Resolved: 2019-07-22

## 2019-07-22 PROCEDURE — 87624 HPV HI-RISK TYP POOLED RSLT: CPT | Performed by: OBSTETRICS & GYNECOLOGY

## 2019-07-22 PROCEDURE — 88175 CYTOPATH C/V AUTO FLUID REDO: CPT | Performed by: OBSTETRICS & GYNECOLOGY

## (undated) NOTE — LETTER
Genesis Greenwood [address]  [place], [address] [facility]    Consent for Operation  Date: __________________                                Time: _______________    1.  I authorize the performance upon Kassandra Quevedo the following operation: Flexible romero videotape. [facility] will not be responsible for storage or maintenance of this tape. 6. For the purpose of advancing medical education, I consent to the admittance of observers to the Operating Room.   7. I authorize the use of any specimen, organs, ti When the patient is a minor or mentally incompetent to give consent:  Signature of person authorized to consent for patient: ___________________________   Relationship to patient: ____________________________________________________    Signature of Witness these medicines may increase my risk of anesthetic complications. iv. If I am allergic to anything or have had a reaction to anesthesia before. 3. I understand how the anesthesia medicine will help me (benefits).   4. I understand that with any type of an patient’s representative) and answered their questions. The patient or their representative has agreed to have anesthesia services.     _____________________________________________________________________________  Witness        Date   Time  I have sy

## (undated) NOTE — LETTER
[facility]  Greganshu Littlemalena [address] [facility], [address] [place] [address]    Consent for Operation    Date: __________________    Time: _______________    1.  I authorize the performance upon [address] the following operation:                              Harvey Hahn videotape. The [facility] will not be responsible for storage or maintenance of this tape. 6. For the purpose of advancing medical education, I consent to the admittance of observers to the Operating Room.     7. I authorize the use of any specimen, organs Signature of Patient:   ___________________________    When the patient is a minor or mentally incompetent to give consent:  Signature of person authorized to consent for patient: ___________________________   Relationship to patient: _____________________ · If I am allergic to anything or have had a reaction to anesthesia before. 3. I understand how the anesthesia medicine will help me (benefits). 4. I understand that with any type of anesthesia medicine there are risks:  a.  The most common risks are: their representative has agreed to have anesthesia services.     _____________________________________________________________________________  Witness        Date   Time  I have verified that the signature is that of the patient or patient’s representative

## (undated) NOTE — ED AVS SNAPSHOT
[facility] in [address] [place] [address] Drive,[address] Floor    [address] [facility]    Phone:  [phone]    Fax:  Ronal Rose   MRN: [id number]    Department:  [facility] in [facility] END   Date of Visit:  1/6/2017 PHARYNGITIS, REPORT PENDING (ENGLISH)      Disclosure     Insurance plans vary and the physician(s) referred by the Immediate Care may not be covered by your plan.  Please contact your insurance company to determine coverage for follow-up care and referral CARE PHYSICIAN AT ONCE OR GO TO THE EMERGENCY DEPARTMENT. If you have been prescribed any medication(s), please fill your prescription right away and begin taking the medication(s) as directed.     If the Immediate Care Provider has read X-rays, these wi coverage. Patient [address] is a Federal Navigator program that can help with your Affordable Care Act coverage, as well as all types of Medicaid plans.   To get signed up and covered, please call [phone] and ask to get set up for an insuran